# Patient Record
Sex: FEMALE | Race: OTHER | HISPANIC OR LATINO
[De-identification: names, ages, dates, MRNs, and addresses within clinical notes are randomized per-mention and may not be internally consistent; named-entity substitution may affect disease eponyms.]

---

## 2017-02-16 PROBLEM — Z00.00 ENCOUNTER FOR PREVENTIVE HEALTH EXAMINATION: Status: ACTIVE | Noted: 2017-02-16

## 2017-02-23 ENCOUNTER — APPOINTMENT (OUTPATIENT)
Dept: ORTHOPEDIC SURGERY | Facility: CLINIC | Age: 47
End: 2017-02-23

## 2017-06-13 ENCOUNTER — APPOINTMENT (OUTPATIENT)
Dept: ORTHOPEDIC SURGERY | Facility: CLINIC | Age: 47
End: 2017-06-13

## 2018-04-29 ENCOUNTER — EMERGENCY (EMERGENCY)
Facility: HOSPITAL | Age: 48
LOS: 1 days | Discharge: HOME | End: 2018-04-29
Attending: EMERGENCY MEDICINE
Payer: MEDICAID

## 2018-04-29 VITALS
OXYGEN SATURATION: 99 % | SYSTOLIC BLOOD PRESSURE: 120 MMHG | DIASTOLIC BLOOD PRESSURE: 79 MMHG | HEART RATE: 78 BPM | TEMPERATURE: 98 F | RESPIRATION RATE: 18 BRPM

## 2018-04-29 VITALS
RESPIRATION RATE: 17 BRPM | TEMPERATURE: 99 F | OXYGEN SATURATION: 99 % | HEART RATE: 81 BPM | SYSTOLIC BLOOD PRESSURE: 77 MMHG

## 2018-04-29 DIAGNOSIS — Z98.89 OTHER SPECIFIED POSTPROCEDURAL STATES: Chronic | ICD-10-CM

## 2018-04-29 LAB
APPEARANCE UR: CLEAR — SIGNIFICANT CHANGE UP
BACTERIA # UR AUTO: ABNORMAL /HPF
BILIRUB UR-MCNC: NEGATIVE — SIGNIFICANT CHANGE UP
COLOR SPEC: YELLOW — SIGNIFICANT CHANGE UP
COMMENT - URINE: SIGNIFICANT CHANGE UP
DIFF PNL FLD: ABNORMAL
EPI CELLS # UR: SIGNIFICANT CHANGE UP /HPF
GLUCOSE UR QL: NEGATIVE — SIGNIFICANT CHANGE UP
KETONES UR-MCNC: NEGATIVE — SIGNIFICANT CHANGE UP
LEUKOCYTE ESTERASE UR-ACNC: ABNORMAL
NITRITE UR-MCNC: NEGATIVE — SIGNIFICANT CHANGE UP
PH UR: 5 — SIGNIFICANT CHANGE UP (ref 5–8)
PROT UR-MCNC: NEGATIVE — SIGNIFICANT CHANGE UP
RBC CASTS # UR COMP ASSIST: ABNORMAL /HPF (ref 0–2)
SP GR SPEC: 1.02 — SIGNIFICANT CHANGE UP (ref 1.01–1.02)
UROBILINOGEN FLD QL: NEGATIVE — SIGNIFICANT CHANGE UP
WBC UR QL: ABNORMAL /HPF (ref 0–5)

## 2018-04-29 PROCEDURE — 99284 EMERGENCY DEPT VISIT MOD MDM: CPT

## 2018-04-29 PROCEDURE — 81001 URINALYSIS AUTO W/SCOPE: CPT

## 2018-04-29 PROCEDURE — 87186 SC STD MICRODIL/AGAR DIL: CPT

## 2018-04-29 PROCEDURE — 87086 URINE CULTURE/COLONY COUNT: CPT

## 2018-04-29 RX ORDER — IBUPROFEN 200 MG
1 TABLET ORAL
Qty: 20 | Refills: 0 | OUTPATIENT
Start: 2018-04-29 | End: 2018-05-03

## 2018-04-29 RX ORDER — PHENAZOPYRIDINE HCL 100 MG
1 TABLET ORAL
Qty: 6 | Refills: 0 | OUTPATIENT
Start: 2018-04-29 | End: 2018-04-30

## 2018-04-29 RX ORDER — CEFUROXIME AXETIL 250 MG
1 TABLET ORAL
Qty: 14 | Refills: 0 | OUTPATIENT
Start: 2018-04-29 | End: 2018-05-05

## 2018-04-29 NOTE — ED PROVIDER NOTE - OBJECTIVE STATEMENT
48 y/o F pt with no significant PMHx presents to ED c/o throat pain, worse with swallowing, and malodorous urine x 3 days. Pt also reports episode of fever. Pt relays that she works as a  and often has to hold in her urine. Pt currently denies fever, chills, cough, nausea, vomiting, or any other complaints. NKDA.

## 2018-05-01 LAB
-  AMIKACIN: SIGNIFICANT CHANGE UP
-  AMOXICILLIN/CLAVULANIC ACID: SIGNIFICANT CHANGE UP
-  AMPICILLIN/SULBACTAM: SIGNIFICANT CHANGE UP
-  AMPICILLIN: SIGNIFICANT CHANGE UP
-  AZTREONAM: SIGNIFICANT CHANGE UP
-  CEFAZOLIN: SIGNIFICANT CHANGE UP
-  CEFEPIME: SIGNIFICANT CHANGE UP
-  CEFOXITIN: SIGNIFICANT CHANGE UP
-  CEFTRIAXONE: SIGNIFICANT CHANGE UP
-  CIPROFLOXACIN: SIGNIFICANT CHANGE UP
-  ERTAPENEM: SIGNIFICANT CHANGE UP
-  GENTAMICIN: SIGNIFICANT CHANGE UP
-  LEVOFLOXACIN: SIGNIFICANT CHANGE UP
-  MEROPENEM: SIGNIFICANT CHANGE UP
-  NITROFURANTOIN: SIGNIFICANT CHANGE UP
-  PIPERACILLIN/TAZOBACTAM: SIGNIFICANT CHANGE UP
-  TOBRAMYCIN: SIGNIFICANT CHANGE UP
-  TRIMETHOPRIM/SULFAMETHOXAZOLE: SIGNIFICANT CHANGE UP
CULTURE RESULTS: SIGNIFICANT CHANGE UP
METHOD TYPE: SIGNIFICANT CHANGE UP
ORGANISM # SPEC MICROSCOPIC CNT: SIGNIFICANT CHANGE UP
ORGANISM # SPEC MICROSCOPIC CNT: SIGNIFICANT CHANGE UP
SPECIMEN SOURCE: SIGNIFICANT CHANGE UP

## 2018-07-05 ENCOUNTER — EMERGENCY (EMERGENCY)
Facility: HOSPITAL | Age: 48
LOS: 1 days | Discharge: HOME | End: 2018-07-05
Attending: EMERGENCY MEDICINE
Payer: MEDICAID

## 2018-07-05 VITALS
HEART RATE: 80 BPM | SYSTOLIC BLOOD PRESSURE: 119 MMHG | DIASTOLIC BLOOD PRESSURE: 84 MMHG | RESPIRATION RATE: 18 BRPM | TEMPERATURE: 98 F | OXYGEN SATURATION: 98 %

## 2018-07-05 VITALS — WEIGHT: 158.07 LBS | HEIGHT: 62 IN

## 2018-07-05 DIAGNOSIS — Z90.49 ACQUIRED ABSENCE OF OTHER SPECIFIED PARTS OF DIGESTIVE TRACT: Chronic | ICD-10-CM

## 2018-07-05 DIAGNOSIS — Z96.649 PRESENCE OF UNSPECIFIED ARTIFICIAL HIP JOINT: Chronic | ICD-10-CM

## 2018-07-05 DIAGNOSIS — Z98.89 OTHER SPECIFIED POSTPROCEDURAL STATES: Chronic | ICD-10-CM

## 2018-07-05 LAB
ALBUMIN SERPL ELPH-MCNC: 3.9 G/DL — SIGNIFICANT CHANGE UP (ref 3.5–5)
ALP SERPL-CCNC: 101 U/L — SIGNIFICANT CHANGE UP (ref 40–120)
ALT FLD-CCNC: 18 U/L DA — SIGNIFICANT CHANGE UP (ref 10–60)
ANION GAP SERPL CALC-SCNC: 5 MMOL/L — SIGNIFICANT CHANGE UP (ref 5–17)
APPEARANCE UR: CLEAR — SIGNIFICANT CHANGE UP
AST SERPL-CCNC: 16 U/L — SIGNIFICANT CHANGE UP (ref 10–40)
BASOPHILS # BLD AUTO: 0.1 K/UL — SIGNIFICANT CHANGE UP (ref 0–0.2)
BASOPHILS NFR BLD AUTO: 1.6 % — SIGNIFICANT CHANGE UP (ref 0–2)
BILIRUB SERPL-MCNC: 0.3 MG/DL — SIGNIFICANT CHANGE UP (ref 0.2–1.2)
BILIRUB UR-MCNC: NEGATIVE — SIGNIFICANT CHANGE UP
BUN SERPL-MCNC: 11 MG/DL — SIGNIFICANT CHANGE UP (ref 7–18)
CALCIUM SERPL-MCNC: 9 MG/DL — SIGNIFICANT CHANGE UP (ref 8.4–10.5)
CHLORIDE SERPL-SCNC: 107 MMOL/L — SIGNIFICANT CHANGE UP (ref 96–108)
CO2 SERPL-SCNC: 27 MMOL/L — SIGNIFICANT CHANGE UP (ref 22–31)
COLOR SPEC: YELLOW — SIGNIFICANT CHANGE UP
CREAT SERPL-MCNC: 0.69 MG/DL — SIGNIFICANT CHANGE UP (ref 0.5–1.3)
DIFF PNL FLD: NEGATIVE — SIGNIFICANT CHANGE UP
EOSINOPHIL # BLD AUTO: 0.2 K/UL — SIGNIFICANT CHANGE UP (ref 0–0.5)
EOSINOPHIL NFR BLD AUTO: 2.6 % — SIGNIFICANT CHANGE UP (ref 0–6)
GLUCOSE SERPL-MCNC: 81 MG/DL — SIGNIFICANT CHANGE UP (ref 70–99)
GLUCOSE UR QL: NEGATIVE — SIGNIFICANT CHANGE UP
HCG SERPL-ACNC: 1 MIU/ML — SIGNIFICANT CHANGE UP
HCT VFR BLD CALC: 39 % — SIGNIFICANT CHANGE UP (ref 34.5–45)
HGB BLD-MCNC: 12.6 G/DL — SIGNIFICANT CHANGE UP (ref 11.5–15.5)
KETONES UR-MCNC: NEGATIVE — SIGNIFICANT CHANGE UP
LEUKOCYTE ESTERASE UR-ACNC: NEGATIVE — SIGNIFICANT CHANGE UP
LIDOCAIN IGE QN: 142 U/L — SIGNIFICANT CHANGE UP (ref 73–393)
LYMPHOCYTES # BLD AUTO: 2.1 K/UL — SIGNIFICANT CHANGE UP (ref 1–3.3)
LYMPHOCYTES # BLD AUTO: 34.2 % — SIGNIFICANT CHANGE UP (ref 13–44)
MCHC RBC-ENTMCNC: 24.9 PG — LOW (ref 27–34)
MCHC RBC-ENTMCNC: 32.4 GM/DL — SIGNIFICANT CHANGE UP (ref 32–36)
MCV RBC AUTO: 77 FL — LOW (ref 80–100)
MONOCYTES # BLD AUTO: 0.5 K/UL — SIGNIFICANT CHANGE UP (ref 0–0.9)
MONOCYTES NFR BLD AUTO: 8.5 % — SIGNIFICANT CHANGE UP (ref 2–14)
NEUTROPHILS # BLD AUTO: 3.3 K/UL — SIGNIFICANT CHANGE UP (ref 1.8–7.4)
NEUTROPHILS NFR BLD AUTO: 53.1 % — SIGNIFICANT CHANGE UP (ref 43–77)
NITRITE UR-MCNC: NEGATIVE — SIGNIFICANT CHANGE UP
PH UR: 5 — SIGNIFICANT CHANGE UP (ref 5–8)
PLATELET # BLD AUTO: 287 K/UL — SIGNIFICANT CHANGE UP (ref 150–400)
POTASSIUM SERPL-MCNC: 4.8 MMOL/L — SIGNIFICANT CHANGE UP (ref 3.5–5.3)
POTASSIUM SERPL-SCNC: 4.8 MMOL/L — SIGNIFICANT CHANGE UP (ref 3.5–5.3)
PROT SERPL-MCNC: 8.9 G/DL — HIGH (ref 6–8.3)
PROT UR-MCNC: NEGATIVE — SIGNIFICANT CHANGE UP
RBC # BLD: 5.06 M/UL — SIGNIFICANT CHANGE UP (ref 3.8–5.2)
RBC # FLD: 12.3 % — SIGNIFICANT CHANGE UP (ref 10.3–14.5)
SODIUM SERPL-SCNC: 139 MMOL/L — SIGNIFICANT CHANGE UP (ref 135–145)
SP GR SPEC: 1.02 — SIGNIFICANT CHANGE UP (ref 1.01–1.02)
UROBILINOGEN FLD QL: NEGATIVE — SIGNIFICANT CHANGE UP
WBC # BLD: 6.2 K/UL — SIGNIFICANT CHANGE UP (ref 3.8–10.5)
WBC # FLD AUTO: 6.2 K/UL — SIGNIFICANT CHANGE UP (ref 3.8–10.5)

## 2018-07-05 PROCEDURE — 99284 EMERGENCY DEPT VISIT MOD MDM: CPT | Mod: 25

## 2018-07-05 PROCEDURE — 74176 CT ABD & PELVIS W/O CONTRAST: CPT | Mod: 26

## 2018-07-05 PROCEDURE — 80053 COMPREHEN METABOLIC PANEL: CPT

## 2018-07-05 PROCEDURE — 96374 THER/PROPH/DIAG INJ IV PUSH: CPT

## 2018-07-05 PROCEDURE — 84702 CHORIONIC GONADOTROPIN TEST: CPT

## 2018-07-05 PROCEDURE — 74176 CT ABD & PELVIS W/O CONTRAST: CPT

## 2018-07-05 PROCEDURE — 83690 ASSAY OF LIPASE: CPT

## 2018-07-05 PROCEDURE — 85027 COMPLETE CBC AUTOMATED: CPT

## 2018-07-05 PROCEDURE — 81003 URINALYSIS AUTO W/O SCOPE: CPT

## 2018-07-05 PROCEDURE — 99285 EMERGENCY DEPT VISIT HI MDM: CPT

## 2018-07-05 RX ORDER — KETOROLAC TROMETHAMINE 30 MG/ML
30 SYRINGE (ML) INJECTION ONCE
Qty: 0 | Refills: 0 | Status: DISCONTINUED | OUTPATIENT
Start: 2018-07-05 | End: 2018-07-05

## 2018-07-05 RX ORDER — IBUPROFEN 200 MG
1 TABLET ORAL
Qty: 30 | Refills: 0 | OUTPATIENT
Start: 2018-07-05

## 2018-07-05 RX ADMIN — Medication 30 MILLIGRAM(S): at 15:45

## 2018-07-05 NOTE — ED PROVIDER NOTE - MEDICAL DECISION MAKING DETAILS
46 y/o F pt presents with L flank pain radiating to L lower abdomen. Differential diagnosis includes MSK versus renal colic. Plan for labs, CT Abd/Pel, pain control, and will reassess. 48 y/o F pt presents with L flank pain radiating to L lower abdomen. Differential diagnosis includes MSK versus renal colic. Plan for labs, CT Abd/Pel, pain control, and will reassess.  CT reveals intrarenal stones and some left hydro. home with urology followup

## 2018-07-05 NOTE — ED ADULT NURSE NOTE - OBJECTIVE STATEMENT
AOX3 +ambulatory patient reports L lower back pain radiating to the LLQ pain No nausea vomiting or diarrhea

## 2018-07-05 NOTE — ED PROVIDER NOTE - OBJECTIVE STATEMENT
48 y/o F pt with PMHx of Gallstones (s/p cholecystectomy) and PSHx of Cholecystectomy and Hip Replacement presents with daughter to ED c/o worsening L lower back pain x6 days. Pt describes L lower back pain as radiating to the L lower abdomen, worsened with movement, worsened with twisting (of b/l hips), and worsened when bending over; pain is not worsened by walking and does not radiated down the L leg.  Pt denies nausea, vomiting, diarrhea, hematuria, burning with urination, or any other complaints. Pt also denies Hx of DM, Hx of HTN, Hx of HLD, or having taken any medications for pain relief. NKDA.

## 2018-09-10 NOTE — ED ADULT TRIAGE NOTE - NS ED NURSE DIRECT TO ROOM YN
Labs today, continue current settings.     Reassess food with corresponding CGM reading.     6 months follow up with labs.   Yes

## 2019-10-06 ENCOUNTER — EMERGENCY (EMERGENCY)
Facility: HOSPITAL | Age: 49
LOS: 1 days | Discharge: HOME | End: 2019-10-06
Attending: EMERGENCY MEDICINE
Payer: MEDICAID

## 2019-10-06 VITALS
DIASTOLIC BLOOD PRESSURE: 74 MMHG | HEIGHT: 62 IN | OXYGEN SATURATION: 100 % | TEMPERATURE: 98 F | SYSTOLIC BLOOD PRESSURE: 121 MMHG | RESPIRATION RATE: 20 BRPM | WEIGHT: 149.91 LBS | HEART RATE: 84 BPM

## 2019-10-06 VITALS
RESPIRATION RATE: 18 BRPM | OXYGEN SATURATION: 99 % | SYSTOLIC BLOOD PRESSURE: 118 MMHG | HEART RATE: 78 BPM | DIASTOLIC BLOOD PRESSURE: 68 MMHG | TEMPERATURE: 98 F

## 2019-10-06 DIAGNOSIS — Z90.49 ACQUIRED ABSENCE OF OTHER SPECIFIED PARTS OF DIGESTIVE TRACT: Chronic | ICD-10-CM

## 2019-10-06 DIAGNOSIS — Z98.89 OTHER SPECIFIED POSTPROCEDURAL STATES: Chronic | ICD-10-CM

## 2019-10-06 DIAGNOSIS — Z96.649 PRESENCE OF UNSPECIFIED ARTIFICIAL HIP JOINT: Chronic | ICD-10-CM

## 2019-10-06 PROBLEM — K80.20 CALCULUS OF GALLBLADDER WITHOUT CHOLECYSTITIS WITHOUT OBSTRUCTION: Chronic | Status: ACTIVE | Noted: 2018-07-05

## 2019-10-06 LAB
ALBUMIN SERPL ELPH-MCNC: 3.5 G/DL — SIGNIFICANT CHANGE UP (ref 3.5–5)
ALP SERPL-CCNC: 160 U/L — HIGH (ref 40–120)
ALT FLD-CCNC: 58 U/L DA — SIGNIFICANT CHANGE UP (ref 10–60)
ANION GAP SERPL CALC-SCNC: 5 MMOL/L — SIGNIFICANT CHANGE UP (ref 5–17)
APPEARANCE UR: CLEAR — SIGNIFICANT CHANGE UP
AST SERPL-CCNC: 41 U/L — HIGH (ref 10–40)
BASOPHILS # BLD AUTO: 0.03 K/UL — SIGNIFICANT CHANGE UP (ref 0–0.2)
BASOPHILS NFR BLD AUTO: 0.4 % — SIGNIFICANT CHANGE UP (ref 0–2)
BILIRUB SERPL-MCNC: 0.3 MG/DL — SIGNIFICANT CHANGE UP (ref 0.2–1.2)
BILIRUB UR-MCNC: NEGATIVE — SIGNIFICANT CHANGE UP
BUN SERPL-MCNC: 12 MG/DL — SIGNIFICANT CHANGE UP (ref 7–18)
CALCIUM SERPL-MCNC: 9.1 MG/DL — SIGNIFICANT CHANGE UP (ref 8.4–10.5)
CHLORIDE SERPL-SCNC: 106 MMOL/L — SIGNIFICANT CHANGE UP (ref 96–108)
CO2 SERPL-SCNC: 27 MMOL/L — SIGNIFICANT CHANGE UP (ref 22–31)
COLOR SPEC: YELLOW — SIGNIFICANT CHANGE UP
CREAT SERPL-MCNC: 0.69 MG/DL — SIGNIFICANT CHANGE UP (ref 0.5–1.3)
DIFF PNL FLD: ABNORMAL
EOSINOPHIL # BLD AUTO: 0.1 K/UL — SIGNIFICANT CHANGE UP (ref 0–0.5)
EOSINOPHIL NFR BLD AUTO: 1.4 % — SIGNIFICANT CHANGE UP (ref 0–6)
GLUCOSE SERPL-MCNC: 102 MG/DL — HIGH (ref 70–99)
GLUCOSE UR QL: NEGATIVE — SIGNIFICANT CHANGE UP
HCG UR QL: NEGATIVE — SIGNIFICANT CHANGE UP
HCT VFR BLD CALC: 37.9 % — SIGNIFICANT CHANGE UP (ref 34.5–45)
HGB BLD-MCNC: 11.7 G/DL — SIGNIFICANT CHANGE UP (ref 11.5–15.5)
IMM GRANULOCYTES NFR BLD AUTO: 0.1 % — SIGNIFICANT CHANGE UP (ref 0–1.5)
KETONES UR-MCNC: NEGATIVE — SIGNIFICANT CHANGE UP
LEUKOCYTE ESTERASE UR-ACNC: ABNORMAL
LYMPHOCYTES # BLD AUTO: 1.41 K/UL — SIGNIFICANT CHANGE UP (ref 1–3.3)
LYMPHOCYTES # BLD AUTO: 19.9 % — SIGNIFICANT CHANGE UP (ref 13–44)
MCHC RBC-ENTMCNC: 24.1 PG — LOW (ref 27–34)
MCHC RBC-ENTMCNC: 30.9 GM/DL — LOW (ref 32–36)
MCV RBC AUTO: 78.1 FL — LOW (ref 80–100)
MONOCYTES # BLD AUTO: 0.57 K/UL — SIGNIFICANT CHANGE UP (ref 0–0.9)
MONOCYTES NFR BLD AUTO: 8.1 % — SIGNIFICANT CHANGE UP (ref 2–14)
NEUTROPHILS # BLD AUTO: 4.96 K/UL — SIGNIFICANT CHANGE UP (ref 1.8–7.4)
NEUTROPHILS NFR BLD AUTO: 70.1 % — SIGNIFICANT CHANGE UP (ref 43–77)
NITRITE UR-MCNC: NEGATIVE — SIGNIFICANT CHANGE UP
NRBC # BLD: 0 /100 WBCS — SIGNIFICANT CHANGE UP (ref 0–0)
PH UR: 6 — SIGNIFICANT CHANGE UP (ref 5–8)
PLATELET # BLD AUTO: 280 K/UL — SIGNIFICANT CHANGE UP (ref 150–400)
POTASSIUM SERPL-MCNC: 4.3 MMOL/L — SIGNIFICANT CHANGE UP (ref 3.5–5.3)
POTASSIUM SERPL-SCNC: 4.3 MMOL/L — SIGNIFICANT CHANGE UP (ref 3.5–5.3)
PROT SERPL-MCNC: 8.4 G/DL — HIGH (ref 6–8.3)
PROT UR-MCNC: NEGATIVE — SIGNIFICANT CHANGE UP
RBC # BLD: 4.85 M/UL — SIGNIFICANT CHANGE UP (ref 3.8–5.2)
RBC # FLD: 13.7 % — SIGNIFICANT CHANGE UP (ref 10.3–14.5)
SODIUM SERPL-SCNC: 138 MMOL/L — SIGNIFICANT CHANGE UP (ref 135–145)
SP GR SPEC: 1.01 — SIGNIFICANT CHANGE UP (ref 1.01–1.02)
UROBILINOGEN FLD QL: NEGATIVE — SIGNIFICANT CHANGE UP
WBC # BLD: 7.08 K/UL — SIGNIFICANT CHANGE UP (ref 3.8–10.5)
WBC # FLD AUTO: 7.08 K/UL — SIGNIFICANT CHANGE UP (ref 3.8–10.5)

## 2019-10-06 PROCEDURE — 81025 URINE PREGNANCY TEST: CPT

## 2019-10-06 PROCEDURE — 36415 COLL VENOUS BLD VENIPUNCTURE: CPT

## 2019-10-06 PROCEDURE — 82962 GLUCOSE BLOOD TEST: CPT

## 2019-10-06 PROCEDURE — 87086 URINE CULTURE/COLONY COUNT: CPT

## 2019-10-06 PROCEDURE — 99284 EMERGENCY DEPT VISIT MOD MDM: CPT | Mod: 25

## 2019-10-06 PROCEDURE — 74176 CT ABD & PELVIS W/O CONTRAST: CPT | Mod: 26

## 2019-10-06 PROCEDURE — 71046 X-RAY EXAM CHEST 2 VIEWS: CPT | Mod: 26

## 2019-10-06 PROCEDURE — 99284 EMERGENCY DEPT VISIT MOD MDM: CPT

## 2019-10-06 PROCEDURE — 80053 COMPREHEN METABOLIC PANEL: CPT

## 2019-10-06 PROCEDURE — 74176 CT ABD & PELVIS W/O CONTRAST: CPT

## 2019-10-06 PROCEDURE — 71046 X-RAY EXAM CHEST 2 VIEWS: CPT

## 2019-10-06 PROCEDURE — 85027 COMPLETE CBC AUTOMATED: CPT

## 2019-10-06 PROCEDURE — 81001 URINALYSIS AUTO W/SCOPE: CPT

## 2019-10-06 RX ORDER — ACETAMINOPHEN 500 MG
975 TABLET ORAL ONCE
Refills: 0 | Status: COMPLETED | OUTPATIENT
Start: 2019-10-06 | End: 2019-10-06

## 2019-10-06 RX ADMIN — Medication 975 MILLIGRAM(S): at 12:28

## 2019-10-06 NOTE — ED PROVIDER NOTE - PROGRESS NOTE DETAILS
Sending ABX for suspected felon of left 1st digit, will f/u with Viktor, also f/u urology. Pt is well appearing walking with steady gait, stable for discharge and follow up without fail with medical doctor. I had a detailed discussion with the patient and/or guardian regarding the historical points, exam findings, and any diagnostic results supporting the discharge diagnosis. Pt educated on care and need for follow up. Strict return instructions and red flag signs and symptoms discussed with patient. Questions answered. Pt shows understanding of discharge information and agrees to follow.

## 2019-10-06 NOTE — ED PROVIDER NOTE - PATIENT PORTAL LINK FT
You can access the FollowMyHealth Patient Portal offered by [facility] by registering at the following website: [email or web address]. By joining [facility]’s FollowMyHealth portal, you will also be able to view your health information using other applications (apps) compatible with our system.

## 2019-10-06 NOTE — ED PROVIDER NOTE - OBJECTIVE STATEMENT
48 y/o F presents to ED complaining of generalized body aches, headache, left back pain, productive cough, on and off fevers since Monday. Pt adds she has also had frequency in urination x2d. Pt also reports she cannot move her left thumb since Saturday morning. Pt currently on Tylenol and Nyquil. Pt denies all other complaints. NKDA.

## 2019-10-06 NOTE — ED ADULT NURSE NOTE - NSIMPLEMENTINTERV_GEN_ALL_ED
Implemented All Universal Safety Interventions:  [place] to call system. Call bell, personal items and telephone within reach. Instruct patient to call for assistance. Room bathroom lighting operational. Non-slip footwear when patient is off stretcher. Physically safe environment: no spills, clutter or unnecessary equipment. Stretcher in lowest position, wheels locked, appropriate side rails in place.

## 2019-10-06 NOTE — ED PROVIDER NOTE - ATTENDING CONTRIBUTION TO CARE
pt d/c by np. I eval'ed pt on initial arrival in intake and was available for general supervision throughout stay.

## 2019-10-06 NOTE — ED PROVIDER NOTE - CLINICAL SUMMARY MEDICAL DECISION MAKING FREE TEXT BOX
50 y/o F presents to ED complaining of multiple complaints. Pt is afebrile and will do chest x-ray, blood work, urine sample. Will likely need to drain the Perycnea. No red flags for back pain. 48 y/o F presents to ED complaining of multiple complaints. Pt is afebrile and will do chest x-ray, blood work, urine sample. Will likely need to drain the paronychia. No red flags for back pain.

## 2019-10-06 NOTE — ED ADULT TRIAGE NOTE - CHIEF COMPLAINT QUOTE
C/o pain and swelling to left thumb, c/o cough and cold symptoms for several days, family history for DM

## 2019-10-07 LAB
CULTURE RESULTS: SIGNIFICANT CHANGE UP
SPECIMEN SOURCE: SIGNIFICANT CHANGE UP

## 2021-03-15 NOTE — ED PROVIDER NOTE - PHYSICAL EXAMINATION
Patient is returning phone call. Ok to leave a detailed message. L thumb has swelling to digit on dorsal side ulnarly that extends slightly into tuft and does not extend beyond IP joint. EHL 5/5. Normal sensation to bilateral extremities.

## 2021-04-23 NOTE — ED PROVIDER NOTE - NS ED NOTE AC HIGH RISK COUNTRIES
----- Message from Hugo Gallagher MD sent at 4/15/2021  1:54 PM EDT -----  Please inform the patient that recent ultrasound was normal, no evidence of gallstones, normal appearing liver  Please have her follow up as needed, continue PPI therapy, if she has continued symptoms we will proceed with an upper endoscopy  No

## 2022-12-11 NOTE — ED PROVIDER NOTE - RESPIRATORY, MLM
"Last Written Prescription Date:  2/4/2022  Last Fill Quantity: 90,  # refills: 2   Last office visit provider:  12/2/2022     Requested Prescriptions   Pending Prescriptions Disp Refills     pravastatin (PRAVACHOL) 80 MG tablet [Pharmacy Med Name: PRAVASTATIN SODIUM 80 MG TAB] 90 tablet 2     Sig: TAKE 1 TABLET BY MOUTH EVERY EVENING       Statins Protocol Passed - 12/10/2022  9:09 AM        Passed - LDL on file in past 12 months     Recent Labs   Lab Test 02/04/22  0910   LDL 96             Passed - No abnormal creatine kinase in past 12 months     No lab results found.             Passed - Recent (12 mo) or future (30 days) visit within the authorizing provider's specialty     Patient has had an office visit with the authorizing provider or a provider within the authorizing providers department within the previous 12 mos or has a future within next 30 days. See \"Patient Info\" tab in inbasket, or \"Choose Columns\" in Meds & Orders section of the refill encounter.              Passed - Medication is active on med list        Passed - Patient is age 18 or older        Passed - No active pregnancy on record        Passed - No positive pregnancy test in past 12 months             Dee Og RN 12/11/22 4:54 PM  "
Breath sounds clear and equal bilaterally.

## 2024-05-17 ENCOUNTER — APPOINTMENT (OUTPATIENT)
Dept: ORTHOPEDIC SURGERY | Facility: CLINIC | Age: 54
End: 2024-05-17
Payer: MEDICAID

## 2024-05-17 ENCOUNTER — NON-APPOINTMENT (OUTPATIENT)
Age: 54
End: 2024-05-17

## 2024-05-17 VITALS
SYSTOLIC BLOOD PRESSURE: 122 MMHG | HEART RATE: 80 BPM | HEIGHT: 62 IN | BODY MASS INDEX: 30 KG/M2 | DIASTOLIC BLOOD PRESSURE: 77 MMHG | WEIGHT: 163 LBS

## 2024-05-17 DIAGNOSIS — G89.29 LOW BACK PAIN, UNSPECIFIED: ICD-10-CM

## 2024-05-17 DIAGNOSIS — M41.124 ADOLESCENT IDIOPATHIC SCOLIOSIS, THORACIC REGION: ICD-10-CM

## 2024-05-17 DIAGNOSIS — M41.126 ADOLESCENT IDIOPATHIC SCOLIOSIS, LUMBAR REGION: ICD-10-CM

## 2024-05-17 DIAGNOSIS — M54.50 LOW BACK PAIN, UNSPECIFIED: ICD-10-CM

## 2024-05-17 DIAGNOSIS — M47.816 SPONDYLOSIS W/OUT MYELOPATHY OR RADICULOPATHY, LUMBAR REGION: ICD-10-CM

## 2024-05-17 DIAGNOSIS — Z78.9 OTHER SPECIFIED HEALTH STATUS: ICD-10-CM

## 2024-05-17 PROCEDURE — 72082 X-RAY EXAM ENTIRE SPI 2/3 VW: CPT

## 2024-05-17 PROCEDURE — 99204 OFFICE O/P NEW MOD 45 MIN: CPT | Mod: 25

## 2024-05-17 RX ORDER — IBUPROFEN 800 MG/1
800 TABLET, FILM COATED ORAL
Qty: 90 | Refills: 0 | Status: ACTIVE | COMMUNITY
Start: 2024-05-17 | End: 1900-01-01

## 2024-05-17 RX ORDER — PANTOPRAZOLE 40 MG/1
40 TABLET, DELAYED RELEASE ORAL DAILY
Qty: 90 | Refills: 3 | Status: ACTIVE | COMMUNITY
Start: 2024-05-17 | End: 1900-01-01

## 2024-05-17 NOTE — HISTORY OF PRESENT ILLNESS
[de-identified] : This 53-year-old woman has a history of chronic back symptoms.  She underwent total hip replacements in 2018 and in 2022.  As an adult she was discovered to have scoliosis.  She has had bilateral lower back pain since 2017.  She has not had associated leg pain.  She has not had lower extremity neurologic symptoms of numbness, paresthesias or weakness and there is no Valsalva effect.  She gets occasional night pain.  The pain is worse sitting 10 minutes and worse standing for half hour as well as worse walking.  She had a course of physical therapy without help and was taking 2 Aleve twice a day with only minimal benefit. [Pain Location] : pain [Worsening] : worsening [7] : a maximum pain level of 7/10 [Walking] : walking [Sitting] : sitting [Standing] : standing

## 2024-05-17 NOTE — PHYSICAL EXAM
[de-identified] : She is fully alert and oriented with a normal mood and affect.  She is in no acute distress as I take the history.  She ambulates with a normal gait including tiptoe and heel walking.  There are no cutaneous abnormalities or palpable bony defects of the spine.  There is no evidence of shortness of breath or respiratory distress.  There is no paravertebral muscle spasm.  There is mild bilateral trochanteric and sciatic notch sensitivity.  In stance evaluation there is a mild shoulder height discrepancy and a mild waistline discrepancy with a small right flank crease.  Forward flexion of the spine at 60 degrees causes mild lower back pain.  Lower extremity neurological examination revealed 1+ symmetrical reflexes.  Motor power is normal to manual testing all lower extremity groups and sensation is normal to light touch in all dermatomes.  Straight leg raising is negative to 90 degrees in the sitting position bilaterally.  Her knees and ankles have a full and painless range of motion with normal stability.  Vascular examination shows no evidence of significant varicosities and there is no lymphedema. [de-identified] : AP and lateral x-rays of the spine standing are obtained.  The lateral x-ray reveals normal sagittal alignment.  There are changes of significant facet arthrosis in the lower lumbar spine.  The AP x-ray reveals a 28 degree right thoracic and a 30 degree left lumbar scoliosis with significant degenerative changes throughout the lumbar spine.  There are no destructive changes.

## 2024-05-17 NOTE — DISCUSSION/SUMMARY
[Medication Risks Reviewed] : Medication risks reviewed [de-identified] : She will use moist heat.  She tells me that ibuprofen at times in the past caused some upper GI upset.  She has been started on pantoprazole 40 mg once a day and after 3 to 4 days will start on ibuprofen 800 mg 3 times a day.  She will call if there are problems with the medication or worsening of her symptoms and I will see her for follow-up in 4 weeks on a as needed basis.

## 2024-06-11 ENCOUNTER — APPOINTMENT (OUTPATIENT)
Dept: ORTHOPEDIC SURGERY | Facility: CLINIC | Age: 54
End: 2024-06-11

## 2024-08-20 ENCOUNTER — APPOINTMENT (OUTPATIENT)
Dept: PODIATRY | Facility: CLINIC | Age: 54
End: 2024-08-20
Payer: MEDICAID

## 2024-08-20 DIAGNOSIS — M72.2 PLANTAR FASCIAL FIBROMATOSIS: ICD-10-CM

## 2024-08-20 DIAGNOSIS — Z82.49 FAMILY HISTORY OF ISCHEMIC HEART DISEASE AND OTHER DISEASES OF THE CIRCULATORY SYSTEM: ICD-10-CM

## 2024-08-20 DIAGNOSIS — Z82.3 FAMILY HISTORY OF STROKE: ICD-10-CM

## 2024-08-20 DIAGNOSIS — Z83.3 FAMILY HISTORY OF DIABETES MELLITUS: ICD-10-CM

## 2024-08-20 PROCEDURE — 99203 OFFICE O/P NEW LOW 30 MIN: CPT | Mod: 25

## 2024-08-20 PROCEDURE — 73630 X-RAY EXAM OF FOOT: CPT | Mod: LT

## 2024-08-20 PROCEDURE — 20550 NJX 1 TENDON SHEATH/LIGAMENT: CPT | Mod: LT

## 2024-08-21 NOTE — PHYSICAL EXAM
[2+] : right foot dorsalis pedis 2+ [No Joint Swelling] : no joint swelling [Normal Foot/Ankle] : Both lower extremities were exposed and visualized. Standing exam demonstrates normal foot posture and alignment. Hindfoot exam shows no hindfoot valgus or varus [Skin Color & Pigmentation] : normal skin color and pigmentation [Skin Turgor] : normal skin turgor [Skin Lesions] : no skin lesions [Sensation] : the sensory exam was normal to light touch and pinprick [No Focal Deficits] : no focal deficits [Deep Tendon Reflexes (DTR)] : deep tendon reflexes were 2+ and symmetric [Motor Exam] : the motor exam was normal [General Appearance - Alert] : alert [Oriented To Time, Place, And Person] : oriented to person, place, and time [Ankle Swelling (On Exam)] : not present [Varicose Veins Of Lower Extremities] : not present [] : not present [Delayed in the Right Toes] : capillary refills normal in right toes [Delayed in the Left Toes] : capillary refills normal in the left toes [de-identified] : Pain on palpation of the medial tuberosity of the left heel and at the insertion of the plantar fascia.  Pain with stretching of the plantar fascia.  No pain on medial to lateral compression of the heel. [Foot Ulcer] : no foot ulcer [Skin Induration] : no skin induration

## 2024-08-21 NOTE — ASSESSMENT
[FreeTextEntry1] : Impression: Plantar fasciitis, left (M72.2).  Recommendations: Attempt at injection into the left heel.  After discussing all risks, benefits and alternatives, patient consented.  Treatment: Patient was injected with a combination of 1% Xylocaine, plain, 0.5% Marcaine, plain and Kenalog-40, a total of 3cc's into the left heel.  Patient was given home care instructions, ice therapy and stretching exercises as well as shoe gear modifications.  Will reevaluate in 2 weeks, if pain persists.  Any questions or problems, patient is to contact the office.

## 2024-08-21 NOTE — CONSULT LETTER
[Dear  ___] : Dear  [unfilled], [Courtesy Letter:] : I had the pleasure of seeing your patient, [unfilled], in my office today. [Please see my note below.] : Please see my note below. [Consult Closing:] : Thank you very much for allowing me to participate in the care of this patient.  If you have any questions, please do not hesitate to contact me. [Sincerely,] : Sincerely, [FreeTextEntry2] : Nohelia Mccoy MD [facility] [address] [facility] [address] [place], [place] [id number]  [FreeTextEntry3] : Charles M. Lombardi, DPM, FACFAS Systems Chief, Podiatric Services [facility] Assistant Professor of Surgery [facility] [employer] at [facility]

## 2024-08-21 NOTE — PHYSICAL EXAM
[2+] : right foot dorsalis pedis 2+ [No Joint Swelling] : no joint swelling [Normal Foot/Ankle] : Both lower extremities were exposed and visualized. Standing exam demonstrates normal foot posture and alignment. Hindfoot exam shows no hindfoot valgus or varus [Skin Color & Pigmentation] : normal skin color and pigmentation [Skin Turgor] : normal skin turgor [Skin Lesions] : no skin lesions [Sensation] : the sensory exam was normal to light touch and pinprick [No Focal Deficits] : no focal deficits [Deep Tendon Reflexes (DTR)] : deep tendon reflexes were 2+ and symmetric [Motor Exam] : the motor exam was normal [General Appearance - Alert] : alert [Oriented To Time, Place, And Person] : oriented to person, place, and time [Ankle Swelling (On Exam)] : not present [Varicose Veins Of Lower Extremities] : not present [] : not present [Delayed in the Right Toes] : capillary refills normal in right toes [Delayed in the Left Toes] : capillary refills normal in the left toes [de-identified] : Pain on palpation of the medial tuberosity of the left heel and at the insertion of the plantar fascia.  Pain with stretching of the plantar fascia.  No pain on medial to lateral compression of the heel. [Foot Ulcer] : no foot ulcer [Skin Induration] : no skin induration

## 2024-08-21 NOTE — CONSULT LETTER
[Dear  ___] : Dear  [unfilled], [Courtesy Letter:] : I had the pleasure of seeing your patient, [unfilled], in my office today. [Please see my note below.] : Please see my note below. [Consult Closing:] : Thank you very much for allowing me to participate in the care of this patient.  If you have any questions, please do not hesitate to contact me. [Sincerely,] : Sincerely, [FreeTextEntry2] : Nohelia Mccoy MD [facility] [address] [facility] [address] [place], [place] [id number]  [FreeTextEntry3] : Charles M. Lombardi, DPM, FACFAS Systems Chief, Podiatric Services [facility] Assistant Professor of Surgery [employer] at [facility]

## 2024-08-21 NOTE — HISTORY OF PRESENT ILLNESS
[FreeTextEntry1] : Patient presents today with a chief complaint of a painful left heel that has been present for over a month.  She has pain with 1st step in the morning or any time, she sits for a while and then gets up.  Patient has typical post static dyskinesia and pain on palpation of the medial tuberosity.  She has not attempted any treatments for this and the pain is getting progressively worse.

## 2024-08-21 NOTE — PROCEDURE
[Plantar Fascia Injection] : ~M plantar fascia (heel) injection [Left Foot] : on the left foot [Therapeutic] : therapeutic [Patient] : the patient [Risks] : risks [Benefits] : benefits [Alternatives] : alternatives [1%] : 1%  [Alcohol] : alcohol [25 gauge] : A 25 gauge needle was used [Kenalog 40] : Kenalog 40 [Tolerated Well] : tolerated the procedure well [No Complications] : There were no complications. [Instructions Given] : given handouts/patient instructions [Patient Instructed to Call] : instructed to call if redness at site, a decrease in range of motion or an increase in pain is noted after procedure. [FreeTextEntry1] : X-rays were taken to rule out for any fractures. (3 views - left foot) No evidence of any fracture/dislocation.   There is noted to be a retrocalcaneal spur.  No evidence of an inferior spur.

## 2024-08-21 NOTE — PHYSICAL EXAM
[2+] : right foot dorsalis pedis 2+ [No Joint Swelling] : no joint swelling [Normal Foot/Ankle] : Both lower extremities were exposed and visualized. Standing exam demonstrates normal foot posture and alignment. Hindfoot exam shows no hindfoot valgus or varus [Skin Color & Pigmentation] : normal skin color and pigmentation [Skin Turgor] : normal skin turgor [Skin Lesions] : no skin lesions [Sensation] : the sensory exam was normal to light touch and pinprick [No Focal Deficits] : no focal deficits [Deep Tendon Reflexes (DTR)] : deep tendon reflexes were 2+ and symmetric [Motor Exam] : the motor exam was normal [General Appearance - Alert] : alert [Oriented To Time, Place, And Person] : oriented to person, place, and time [Ankle Swelling (On Exam)] : not present [Varicose Veins Of Lower Extremities] : not present [] : not present [Delayed in the Right Toes] : capillary refills normal in right toes [Delayed in the Left Toes] : capillary refills normal in the left toes [de-identified] : Pain on palpation of the medial tuberosity of the left heel and at the insertion of the plantar fascia.  Pain with stretching of the plantar fascia.  No pain on medial to lateral compression of the heel. [Foot Ulcer] : no foot ulcer [Skin Induration] : no skin induration

## 2024-08-21 NOTE — CONSULT LETTER
[Dear  ___] : Dear  [unfilled], [Courtesy Letter:] : I had the pleasure of seeing your patient, [unfilled], in my office today. [Please see my note below.] : Please see my note below. [Consult Closing:] : Thank you very much for allowing me to participate in the care of this patient.  If you have any questions, please do not hesitate to contact me. [Sincerely,] : Sincerely, [FreeTextEntry2] : Nohelia Mccoy MD [facility] [address] [facility] [address] [place], [place] [id number]  [FreeTextEntry3] : Charles M. Lombardi, DPM, FACFAS Systems Chief, Podiatric Services [facility] Assistant Professor of Surgery [facility] [facility] [employer] at [facility]

## 2024-09-03 ENCOUNTER — APPOINTMENT (OUTPATIENT)
Dept: PODIATRY | Facility: CLINIC | Age: 54
End: 2024-09-03

## 2025-05-22 ENCOUNTER — NON-APPOINTMENT (OUTPATIENT)
Age: 55
End: 2025-05-22

## 2025-05-22 ENCOUNTER — APPOINTMENT (OUTPATIENT)
Dept: PODIATRY | Facility: CLINIC | Age: 55
End: 2025-05-22
Payer: MEDICAID

## 2025-05-22 DIAGNOSIS — M79.672 PAIN IN LEFT FOOT: ICD-10-CM

## 2025-05-22 DIAGNOSIS — M72.2 PLANTAR FASCIAL FIBROMATOSIS: ICD-10-CM

## 2025-05-22 PROCEDURE — 99214 OFFICE O/P EST MOD 30 MIN: CPT | Mod: 25

## 2025-05-22 PROCEDURE — 20550 NJX 1 TENDON SHEATH/LIGAMENT: CPT | Mod: LT

## 2025-07-01 ENCOUNTER — APPOINTMENT (OUTPATIENT)
Dept: PODIATRY | Facility: CLINIC | Age: 55
End: 2025-07-01
Payer: MEDICAID

## 2025-07-01 PROCEDURE — 99213 OFFICE O/P EST LOW 20 MIN: CPT | Mod: 25

## 2025-07-01 PROCEDURE — 20550 NJX 1 TENDON SHEATH/LIGAMENT: CPT | Mod: LT
